# Patient Record
Sex: MALE | Race: WHITE | NOT HISPANIC OR LATINO | Employment: STUDENT | URBAN - METROPOLITAN AREA
[De-identification: names, ages, dates, MRNs, and addresses within clinical notes are randomized per-mention and may not be internally consistent; named-entity substitution may affect disease eponyms.]

---

## 2017-02-01 DIAGNOSIS — F84.5 ASPERGER'S SYNDROME: ICD-10-CM

## 2017-02-01 DIAGNOSIS — F42.4 SKIN PICKING HABIT: ICD-10-CM

## 2017-02-01 DIAGNOSIS — F98.8 ADD (ATTENTION DEFICIT DISORDER): ICD-10-CM

## 2017-02-01 DIAGNOSIS — F42.9 OBSESSIVE-COMPULSIVE DISORDER, UNSPECIFIED TYPE: ICD-10-CM

## 2017-02-01 RX ORDER — CITALOPRAM 40 MG/1
60 TABLET, FILM COATED ORAL DAILY
Qty: 45 TABLET | Refills: 5 | Status: SHIPPED | OUTPATIENT
Start: 2017-02-01 | End: 2017-04-26 | Stop reason: SDUPTHER

## 2017-02-01 RX ORDER — NALTREXONE HYDROCHLORIDE 50 MG/1
50 TABLET, FILM COATED ORAL DAILY
Qty: 30 TABLET | Refills: 5 | Status: SHIPPED | OUTPATIENT
Start: 2017-02-01 | End: 2017-03-15 | Stop reason: SDUPTHER

## 2017-02-01 RX ORDER — ATOMOXETINE 80 MG/1
160 CAPSULE ORAL DAILY
Qty: 60 CAPSULE | Refills: 5 | Status: SHIPPED | OUTPATIENT
Start: 2017-02-01 | End: 2017-03-15 | Stop reason: SDUPTHER

## 2017-02-01 RX ORDER — FLUPHENAZINE HYDROCHLORIDE 1 MG/1
2 TABLET ORAL DAILY
Qty: 60 TABLET | Refills: 5 | Status: SHIPPED | OUTPATIENT
Start: 2017-02-01 | End: 2017-03-15 | Stop reason: SDUPTHER

## 2017-03-15 ENCOUNTER — OFFICE VISIT (OUTPATIENT)
Dept: PSYCHIATRY | Facility: CLINIC | Age: 27
End: 2017-03-15
Payer: MEDICAID

## 2017-03-15 VITALS
WEIGHT: 139.81 LBS | DIASTOLIC BLOOD PRESSURE: 69 MMHG | HEIGHT: 69 IN | BODY MASS INDEX: 20.71 KG/M2 | SYSTOLIC BLOOD PRESSURE: 119 MMHG | HEART RATE: 75 BPM

## 2017-03-15 DIAGNOSIS — F84.0 AUTISM SPECTRUM DISORDER: ICD-10-CM

## 2017-03-15 DIAGNOSIS — F42.9 OBSESSIVE-COMPULSIVE DISORDER, UNSPECIFIED TYPE: ICD-10-CM

## 2017-03-15 DIAGNOSIS — F98.8 ADD (ATTENTION DEFICIT DISORDER): ICD-10-CM

## 2017-03-15 DIAGNOSIS — F40.10 SOCIAL ANXIETY DISORDER: Primary | ICD-10-CM

## 2017-03-15 DIAGNOSIS — F42.4 EXCORIATION (SKIN-PICKING) DISORDER: ICD-10-CM

## 2017-03-15 PROCEDURE — 99999 PR PBB SHADOW E&M-EST. PATIENT-LVL III: CPT | Mod: PBBFAC,,, | Performed by: PSYCHIATRY & NEUROLOGY

## 2017-03-15 PROCEDURE — 99213 OFFICE O/P EST LOW 20 MIN: CPT | Mod: PBBFAC | Performed by: PSYCHIATRY & NEUROLOGY

## 2017-03-15 PROCEDURE — 99213 OFFICE O/P EST LOW 20 MIN: CPT | Mod: AF,S$PBB,, | Performed by: PSYCHIATRY & NEUROLOGY

## 2017-03-15 PROCEDURE — 90833 PSYTX W PT W E/M 30 MIN: CPT | Mod: PBBFAC | Performed by: PSYCHIATRY & NEUROLOGY

## 2017-03-15 PROCEDURE — 90833 PSYTX W PT W E/M 30 MIN: CPT | Mod: AF,S$PBB,, | Performed by: PSYCHIATRY & NEUROLOGY

## 2017-03-15 RX ORDER — NALTREXONE HYDROCHLORIDE 50 MG/1
25 TABLET, FILM COATED ORAL DAILY
Qty: 7 TABLET | Refills: 0
Start: 2017-03-25 | End: 2017-04-01

## 2017-03-15 RX ORDER — ATOMOXETINE 80 MG/1
80 CAPSULE ORAL DAILY
Qty: 14 CAPSULE | Refills: 0
Start: 2017-04-03 | End: 2017-04-26 | Stop reason: ALTCHOICE

## 2017-03-15 RX ORDER — FLUPHENAZINE HYDROCHLORIDE 1 MG/1
1 TABLET ORAL DAILY
Qty: 7 TABLET | Refills: 0
Start: 2017-03-15 | End: 2017-04-26 | Stop reason: ALTCHOICE

## 2017-03-15 NOTE — PATIENT INSTRUCTIONS
1. Lets taper off fluphenazine first. Decrease it to 1 tablet daily for 7 days and then stop it.    2. Then in 10 days, decrease naltrexone to one half tablet daily for 7 days and then discontinue it completely.    3. On 4/3/2017, decrease Strattera down to one capsule daily for 14 days and then stop it completely.    4. See me back in the office at the end of April to check in and see if it is okay to take on a taper of citalopram.

## 2017-03-15 NOTE — MR AVS SNAPSHOT
[facility]  [address]  [facility] [address]  Phone: [phone]                  Jeffery Iniguez   3/15/2017 1:00 PM   Office Visit    Description:  Male : 1990   Provider:  Tyson Peacock MD   Department:  [facility]           Reason for Visit     Anxiety     Compulsions     attention dysregulation     Autism spectrum disorder           Diagnoses this Visit        Comments    Social anxiety disorder    -  Primary     Obsessive-compulsive disorder, unspecified type         Autism spectrum disorder         ADD (attention deficit disorder)         Excoriation (skin-picking) disorder                To Do List           Goals (5 Years of Data)     None      Follow-Up and Disposition     Return in about 6 weeks (around 2017) for f/u of medication and developmental progress.       These Medications        Disp Refills Start End    fluphenazine (PROLIXIN) 1 MG tablet 7 tablet 0 3/15/2017 3/22/2017    Take 1 tablet (1 mg total) by mouth once daily. Stop completely on or about 3/22/2017 - Oral    Pharmacy: [facility]/pharmacy #[id number] - [place], [place] - [address] Ph #: [phone]       naltrexone (DEPADE) 50 mg tablet 7 tablet 0 3/25/2017 2017    Take 0.5 tablets (25 mg total) by mouth once daily. For 7 days then discontinue - Oral    Pharmacy: [facility]/pharmacy #[id number] - [place], [place] - [address] Ph #: [phone]       atomoxetine (STRATTERA) 80 MG capsule 14 capsule 0 4/3/2017 2017    Take 1 capsule (80 mg total) by mouth once daily. (decrease to this dose on 4/3/2017, and stop it completely on or about 2017) - Oral    Pharmacy: [facility]/pharmacy #[id number]  [place], [place] - [address] Ph #: [phone]         [facility] On Call     [facility] On Call Nurse Care Line -  Assistance  Registered nurses in the [facility] provide clinical advisement, health education, appointment booking, and other advisory  services.  Call for this free service at [phone].             Medications           Message regarding Medications     Verify the changes and/or additions to your medication regime listed below are the same as discussed with your clinician today.  If any of these changes or additions are incorrect, please notify your healthcare provider.        CHANGE how you are taking these medications     Start Taking Instead of    fluphenazine (PROLIXIN) 1 MG tablet fluphenazine (PROLIXIN) 1 MG tablet    Dosage:  Take 1 tablet (1 mg total) by mouth once daily. Stop completely on or about 3/22/2017 Dosage:  Take 2 tablets (2 mg total) by mouth once daily.    Reason for Change:  Reorder     naltrexone (DEPADE) 50 mg tablet naltrexone (DEPADE) 50 mg tablet    Dosage:  Take 0.5 tablets (25 mg total) by mouth once daily. For 7 days then discontinue Dosage:  Take 1 tablet (50 mg total) by mouth once daily.    Reason for Change:  Reorder     Starting on: 3/25/2017     atomoxetine (STRATTERA) 80 MG capsule atomoxetine (STRATTERA) 80 MG capsule    Dosage:  Take 1 capsule (80 mg total) by mouth once daily. (decrease to this dose on 4/3/2017, and stop it completely on or about 4/17/2017) Dosage:  Take 2 capsules (160 mg total) by mouth once daily.    Reason for Change:  Reorder     Starting on: 4/3/2017            Verify that the below list of medications is an accurate representation of the medications you are currently taking.  If none reported, the list may be blank. If incorrect, please contact your healthcare provider. Carry this list with you in case of emergency.           Current Medications     AFLURIA [id number], PF, 45 mcg (15 mcg x 3)/0.5 mL Syrg     atomoxetine (STRATTERA) 80 MG capsule Starting on Apr 03, 2017. Take 1 capsule (80 mg total) by mouth once daily. (decrease to this dose on 4/3/2017, and stop it completely on or about 4/17/2017)    citalopram (CELEXA) 40 MG tablet Take 1.5 tablets (60 mg total) by mouth once  "daily.    clindamycin phosphate 1% (CLINDAGEL) 1 % gel     doxycycline (VIBRAMYCIN) 100 MG Cap     fluphenazine (PROLIXIN) 1 MG tablet Take 1 tablet (1 mg total) by mouth once daily. Stop completely on or about 3/22/2017    naltrexone (DEPADE) 50 mg tablet Starting on Mar 25, 2017. Take 0.5 tablets (25 mg total) by mouth once daily. For 7 days then discontinue    tretinoin (RETIN-A) 0.05 % cream            Clinical Reference Information           Your Vitals Were     BP Pulse Height Weight BMI    119/69 75 5' 9" (1.753 m) 63.4 kg (139 lb 12.8 oz) 20.64 kg/m2      Blood Pressure          Most Recent Value    BP  119/69      Allergies as of 3/15/2017     No Known Allergies      Immunizations Administered on Date of Encounter - 3/15/2017     None      [facility] Sign-Up     Activating your [facility] account is as easy as 1-2-3!     1) Visit [email or web address], select Sign Up Now, enter this activation code and your date of birth, then select Next.  [email or web address]  Expires: 4/29/2017  1:23 PM      2) Create a username and password to use when you visit [facility] in the future and select a security question in case you lose your password and select Next.    3) Enter your e-mail address and click Sign Up!    Additional Information  If you have questions, please e-mail [email or web address] or call [phone] to talk to our [facility] staff. Remember, [facility] is NOT to be used for urgent needs. For medical emergencies, dial 911.         Instructions    1. Lets taper off fluphenazine first. Decrease it to 1 tablet daily for 7 days and then stop it.    2. Then in 10 days, decrease naltrexone to one half tablet daily for 7 days and then discontinue it completely.    3. On 4/3/2017, decrease Strattera down to one capsule daily for 14 days and then stop it completely.    4. See me back in the office at the end of April to check in and see if it is okay to take on a taper of citalopram.       Language Assistance Services     " ATTENTION: Language assistance services are available, free of charge. Please call [phone].      ATENCIÓN: Si habla español, tiene a erazo disposición servicios gratuitos de asistencia lingüística. Llame al [phone].     CHÚ Ý: N?u b?n nói Ti?ng Vi?t, có các d?ch v? h? tr? ngôn ng? mi?n phí dành cho b?n. G?i s? [phone].         [facility] complies with applicable Federal civil rights laws and does not discriminate on the basis of race, color, national origin, age, disability, or sex.

## 2017-03-15 NOTE — PROGRESS NOTES
Outpatient Psychiatry Follow-Up Visit (MD/NP)  3/15/2017    Clinical Status of Patient:  Outpatient (Ambulatory)    Chief Complaint:  Jeffery Iniguez is a 26 y.o. 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 The patient's progress toward treatment goals is good.   · Duration of intervention: 21 minutes    Review of Systems   History obtained from the patient.  General ROS: negative for - fatigue, weight gain and weight loss  Ophthalmic ROS: negative for - decreased vision or dry eyes  ENT ROS: negative for - epistaxis, nasal congestion or oral lesions  Hematological and Lymphatic ROS: negative for - bruising, jaundice or pallor  Endocrine ROS: negative for - change in hair pattern, galactorrhea, skin changes or temperature intolerance  Respiratory ROS: no cough, shortness of breath, or wheezing  Cardiovascular ROS: no chest pain or dyspnea on exertion  Gastrointestinal ROS: no abdominal pain, change in bowel habits  Urinary ROS: no dysuria, trouble voiding or hematuria  Musculoskeletal ROS: negative for - joint pain, muscle pain or excess muscle tone  Neurological ROS: negative for - headaches, seizures, tremors, tics, or other AIMs  Dermatological  ROS: negative for pruritus and rash. Acne is gradually improving    Past Medical, Family and Social History: The patient's past medical, family and social history have been reviewed and updated as appropriate within the electronic medical record - see encounter notes.  Past Medical History:   Diagnosis Date    Autism spectrum disorder, level 1 7/9/2012    Social anxiety disorder 7/9/2012    Girls and dating fears      Current Outpatient Prescriptions on File Prior to Visit   Medication Sig Dispense Refill    AFLURIA [id number], PF, 45 mcg (15 mcg x 3)/0.5 mL Syrg   0    citalopram (CELEXA) 40 MG tablet Take 1.5 tablets (60 mg total) by mouth once daily. 45 tablet 5    clindamycin phosphate 1% (CLINDAGEL) 1 % gel       doxycycline (VIBRAMYCIN) 100 MG Cap       tretinoin (RETIN-A) 0.05 % cream       [DISCONTINUED] atomoxetine (STRATTERA) 80 MG capsule Take 2 capsules (160 mg total) by mouth once daily. 60 capsule 5    [DISCONTINUED] fluphenazine (PROLIXIN) 1 MG tablet Take 2 tablets (2 mg total) by mouth once daily. 60 tablet 5    [DISCONTINUED] naltrexone (DEPADE) 50 mg tablet Take 1 tablet (50 mg total) by mouth once daily. 30 tablet 5     No current facility-administered medications on file prior to visit.    Compliance: yes  Side effects: None    Risk Parameters:  Patient reports no suicidal ideation  Patient reports no homicidal ideation  Patient reports no self-injurious behavior  Patient reports no violent behavior    Exam (detailed: at least 9 elements; comprehensive: all 15 elements)   Constitutional  Vitals:    Vitals:    03/15/17 1307   BP: 119/69   Pulse: 75      General:  younger than stated age, casually dressed, neatly groomed, lean     Musculoskeletal  Muscle Strength/Tone:  no tremor, no tic   Gait & Station:  non-ataxic   Psychiatric  Speech:  spontaneous, rapid   Mood & Affect:  euthymic  mood-congruent   Thought Process:  normal and logical, goal-directed   Associations:  intact   Thought  Content:  normal, no suicidality, no homicidality, delusions, or paranoia   Insight:  has awareness of illness   Judgement: age appropriate   Orientation:  person, place, time/date, situation, day of week   Memory: intact for content of interview   Language: grossly intact   Attention Span & Concentration:  able to focus, completed tasks   Fund of Knowledge:  intact and appropriate to age and level of education     Assessment and Diagnosis   Status/Progress: Based on the examination today, the patient's problem(s) is/are well controlled.  New problems have not been presented today.   Comorbidities complicate management of the primary condition.  There are no active rule-out diagnoses for this patient at this time.    General Impression: doing well, I agree that it is time to attempt to taper off at least part of his complex medication regimen.    Axis I:   1. Social anxiety disorder     2. Obsessive-compulsive disorder, unspecified type  fluphenazine (PROLIXIN) 1 MG tablet   3. Autism spectrum disorder, level 1     4. ADD (attention deficit disorder)  atomoxetine (STRATTERA) 80 MG capsule   5. Excoriation (skin-picking) disorder  naltrexone (DEPADE) 50 mg tablet   6. Skin picking habit       Axis II: mathematics disorder  Axis III: healthy  Axis IV: educational problems  Axis V: 59    Intervention/Counseling/Treatment Plan   · Medication Management: The risks and benefits of medication were discussed with the patient. 1. Lets taper off fluphenazine first. Decrease it to 1 tablet daily for 7 days and then stop it.  · Outside records/collateral information from additional sources: reviewed collateral from parents with whom he lives  · Counseling provided with patient as follows: risks and benefits of treatment options, including medications, were discussed with the patient, risk factor reduction  · Care Coordination: During the visit, care coordination was conducted with  family.    Return to Clinic: 6 weeks

## 2017-04-26 ENCOUNTER — OFFICE VISIT (OUTPATIENT)
Dept: PSYCHIATRY | Facility: CLINIC | Age: 27
End: 2017-04-26
Payer: MEDICAID

## 2017-04-26 VITALS
WEIGHT: 141.81 LBS | BODY MASS INDEX: 21 KG/M2 | HEIGHT: 69 IN | DIASTOLIC BLOOD PRESSURE: 57 MMHG | HEART RATE: 94 BPM | SYSTOLIC BLOOD PRESSURE: 103 MMHG

## 2017-04-26 DIAGNOSIS — F42.9 OBSESSIVE-COMPULSIVE DISORDER, UNSPECIFIED TYPE: Primary | ICD-10-CM

## 2017-04-26 DIAGNOSIS — F98.8 ADD (ATTENTION DEFICIT DISORDER): ICD-10-CM

## 2017-04-26 DIAGNOSIS — F84.5 ASPERGER'S SYNDROME: ICD-10-CM

## 2017-04-26 DIAGNOSIS — F40.10 SOCIAL ANXIETY DISORDER: ICD-10-CM

## 2017-04-26 DIAGNOSIS — F84.0 AUTISM SPECTRUM DISORDER: ICD-10-CM

## 2017-04-26 DIAGNOSIS — F42.4 EXCORIATION (SKIN-PICKING) DISORDER: ICD-10-CM

## 2017-04-26 PROCEDURE — 99213 OFFICE O/P EST LOW 20 MIN: CPT | Mod: PBBFAC | Performed by: PSYCHIATRY & NEUROLOGY

## 2017-04-26 PROCEDURE — 99213 OFFICE O/P EST LOW 20 MIN: CPT | Mod: HB,AF,S$PBB, | Performed by: PSYCHIATRY & NEUROLOGY

## 2017-04-26 PROCEDURE — 90833 PSYTX W PT W E/M 30 MIN: CPT | Mod: PBBFAC | Performed by: PSYCHIATRY & NEUROLOGY

## 2017-04-26 PROCEDURE — 90833 PSYTX W PT W E/M 30 MIN: CPT | Mod: HB,AF,S$PBB, | Performed by: PSYCHIATRY & NEUROLOGY

## 2017-04-26 PROCEDURE — 99999 PR PBB SHADOW E&M-EST. PATIENT-LVL III: CPT | Mod: PBBFAC,,, | Performed by: PSYCHIATRY & NEUROLOGY

## 2017-04-26 RX ORDER — CITALOPRAM 40 MG/1
40 TABLET, FILM COATED ORAL DAILY
Qty: 30 TABLET | Refills: 1 | Status: SHIPPED | OUTPATIENT
Start: 2017-04-26 | End: 2017-08-09 | Stop reason: SDUPTHER

## 2017-04-26 NOTE — MR AVS SNAPSHOT
[facility]  [address]  [place] [address]  Phone: [phone]                  Jeffery Iniguez   2017 1:00 PM   Office Visit    Description:  Male : 1990   Provider:  Tyson Peacock MD   Department:  [facility]           Reason for Visit     Anxiety     attention dysregulation           Diagnoses this Visit        Comments    Obsessive-compulsive disorder, unspecified type    -  Primary     Social anxiety disorder         Autism spectrum disorder         ADD (attention deficit disorder)         Excoriation (skin-picking) disorder         Asperger's syndrome                To Do List           Goals (5 Years of Data)     None      Follow-Up and Disposition     Return in about 2 months (around 2017) for f/u of medication and developmental progress.       These Medications        Disp Refills Start End    citalopram (CELEXA) 40 MG tablet 30 tablet 1 2017    Take 1 tablet (40 mg total) by mouth once daily. - Oral    Pharmacy: [facility]/pharmacy #[id number] - [place], [place] - [address] Ph #: [phone]       Notes to Pharmacy: Please discontinue any remaining refills at the 60 mg daily dose      [facility]     [facility] On Call Nurse Care Line -  Assistance  Unless otherwise directed by your provider, please contact [facility] On-Call, our nurse care line that is available for  assistance.     Registered nurses in the [facility] On Call Center provide: appointment scheduling, clinical advisement, health education, and other advisory services.  Call: [phone] (toll free)               Medications           Message regarding Medications     Verify the changes and/or additions to your medication regime listed below are the same as discussed with your clinician today.  If any of these changes or additions are incorrect, please notify your healthcare provider.        CHANGE how you are taking these medications      "Start Taking Instead of    citalopram (CELEXA) 40 MG tablet citalopram (CELEXA) 40 MG tablet    Dosage:  Take 1 tablet (40 mg total) by mouth once daily. Dosage:  Take 1.5 tablets (60 mg total) by mouth once daily.    Reason for Change:  Reorder       STOP taking these medications     atomoxetine (STRATTERA) 80 MG capsule Take 1 capsule (80 mg total) by mouth once daily. (decrease to this dose on 4/3/2017, and stop it completely on or about 4/17/2017)    fluphenazine (PROLIXIN) 1 MG tablet Take 1 tablet (1 mg total) by mouth once daily. Stop completely on or about 3/22/2017           Verify that the below list of medications is an accurate representation of the medications you are currently taking.  If none reported, the list may be blank. If incorrect, please contact your healthcare provider. Carry this list with you in case of emergency.           Current Medications     AFLURIA [id number], PF, 45 mcg (15 mcg x 3)/0.5 mL Syrg     citalopram (CELEXA) 40 MG tablet Take 1 tablet (40 mg total) by mouth once daily.    clindamycin phosphate 1% (CLINDAGEL) 1 % gel     doxycycline (VIBRAMYCIN) 100 MG Cap     tretinoin (RETIN-A) 0.05 % cream            Clinical Reference Information           Your Vitals Were     BP Pulse Height Weight BMI    103/57 94 5' 9" (1.753 m) 64.3 kg (141 lb 12.8 oz) 20.94 kg/m2      Blood Pressure          Most Recent Value    BP  (!)  103/57      Allergies as of 4/26/2017     No Known Allergies      Immunizations Administered on Date of Encounter - 4/26/2017     None      [facility] Sign-Up     Activating your [facility] account is as easy as 1-2-3!     1) Visit [email or web address], select Sign Up Now, enter this activation code and your date of birth, then select Next.  [email or web address]  Expires: 4/29/2017  1:23 PM      2) Create a username and password to use when you visit [facility] in the future and select a security question in case you lose your password and select Next.    3) Enter your e-mail " address and click Sign Up!    Additional Information  If you have questions, please e-mail [email or web address] or call [phone] to talk to our [facility] staff. Remember, [facility] is NOT to be used for urgent needs. For medical emergencies, dial 911.         Language Assistance Services     ATTENTION: Language assistance services are available, free of charge. Please call [phone].      ATENCIÓN: Si habla justinañol, tiene a erazo disposición servicios gratuitos de asistencia lingüística. Llame al [phone].     CHÚ Ý: N?u b?n nói Ti?ng Vi?t, có các d?ch v? h? tr? ngôn ng? mi?n phí dành cho b?n. G?i s? [phone].         [facility] complies with applicable Federal civil rights laws and does not discriminate on the basis of race, color, national origin, age, disability, or sex.

## 2017-04-26 NOTE — PROGRESS NOTES
Outpatient Psychiatry Follow-Up Visit (MD/NP)  4/26/2017    Clinical Status of Patient:  Outpatient (Ambulatory)    Chief Complaint:  Jeffery Iinguez is a 27 y.o. 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 The patient's progress toward treatment goals is good.   · Duration of intervention: 21 minutes    Review of Systems   History obtained from the patient.  General ROS: negative for - fatigue, weight gain and weight loss  Ophthalmic ROS: negative for - decreased vision or dry eyes  ENT ROS: negative for - epistaxis, nasal congestion or oral lesions  Hematological and Lymphatic ROS: negative for - bruising, jaundice or pallor  Endocrine ROS: negative for - change in hair pattern, galactorrhea, skin changes or temperature intolerance  Respiratory ROS: no cough, shortness of breath, or wheezing  Cardiovascular ROS: no chest pain or dyspnea on exertion  Gastrointestinal ROS: no abdominal pain, change in bowel habits  Urinary ROS: no dysuria, trouble voiding or hematuria  Musculoskeletal ROS: negative for - joint pain, muscle pain or excess muscle tone  Neurological ROS: negative for - headaches, seizures, tremors, tics, or other AIMs  Dermatological ROS: negative for pruritus and rash.  Acne is gradually improving    Past Medical, Family and Social History: The patient's past medical, family and social history have been reviewed and updated as appropriate within the electronic medical record - see encounter notes.  Past Medical History:   Diagnosis Date    Autism spectrum disorder, level 1 7/9/2012    Social anxiety disorder 7/9/2012    Girls and dating fears      Current Outpatient Prescriptions on File Prior to Visit   Medication Sig Dispense Refill    AFLURIA [id number], PF, 45 mcg (15 mcg x 3)/0.5 mL Syrg   0    clindamycin phosphate 1% (CLINDAGEL) 1 % gel       doxycycline (VIBRAMYCIN) 100 MG Cap       tretinoin (RETIN-A) 0.05 % cream       [DISCONTINUED] atomoxetine (STRATTERA) 80 MG capsule Take 1 capsule (80 mg total) by mouth once daily. (decrease to this dose on 4/3/2017, and stop it completely on or about 4/17/2017) 14 capsule 0    [DISCONTINUED] citalopram (CELEXA) 40 MG tablet Take 1.5 tablets (60 mg total) by mouth once daily. 45 tablet 5    [DISCONTINUED] fluphenazine (PROLIXIN) 1 MG tablet Take 1 tablet (1 mg total) by mouth once daily. Stop completely on or about 3/22/2017 7 tablet 0     No current facility-administered medications on file prior to visit.    Compliance: yes  Side effects: None    Risk Parameters:  Patient reports no suicidal ideation  Patient reports no homicidal ideation  Patient reports no self-injurious behavior  Patient reports no violent behavior    Exam (detailed: at least 9 elements; comprehensive: all 15 elements)   Constitutional  Vitals:    Vitals:    04/26/17 1302   BP: (!) 103/57   Pulse: 94      General:  younger than stated age, casually dressed, neatly groomed, lean     Musculoskeletal  Muscle Strength/Tone:  no tremor, no tic   Gait & Station:  non-ataxic   Psychiatric  Speech:  spontaneous, rapid   Mood & Affect:  euthymic  mood-congruent   Thought Process:  normal and logical, goal-directed   Associations:  intact   Thought Content:   normal, no suicidality, no homicidality, delusions, or paranoia   Insight:  has awareness of illness   Judgement: age appropriate   Orientation:  person, place, time/date, situation, day of week   Memory: intact for content of interview   Language: grossly intact   Attention Span & Concentration:  able to focus, completed tasks   Fund of Knowledge:  intact and appropriate to age and level of education     Assessment and Diagnosis   Status/Progress: Based on the examination today, the patient's problem(s) is/are well controlled.  New problems have not been presented today.   Comorbidities complicate management of the primary condition.  There are no active rule-out diagnoses for this patient at this time.    General Impression:   doing well, I agree that it is time to attempt to taper off at least part of his complex medication regimen.    Axis I:   1. Obsessive-compulsive disorder, unspecified type     2. Social anxiety disorder     3. Autism spectrum disorder, level 1     4. ADD (attention deficit disorder)     5. Excoriation (skin-picking) disorder     6. Asperger's syndrome  citalopram (CELEXA) 40 MG tablet     Axis II: mathematics disorder  Axis III: healthy  Axis IV: educational problems  Axis V: 59    Intervention/Counseling/Treatment Plan   · Medication Management: The risks and benefits of medication were discussed with the patient. 1. Lets taper off fluphenazine first. Decrease it to 1 tablet daily for 7 days and then stop it.  · Outside records/collateral information from additional sources: reviewed collateral from parents with whom he lives  · Counseling provided with patient as follows: risks and benefits of treatment options, including medications, were discussed with the patient, risk factor reduction  · Care Coordination: During the visit, care coordination was conducted with  family.    Return to Clinic: 6 weeks

## 2017-08-09 ENCOUNTER — OFFICE VISIT (OUTPATIENT)
Dept: PSYCHIATRY | Facility: CLINIC | Age: 27
End: 2017-08-09
Payer: MEDICAID

## 2017-08-09 VITALS
SYSTOLIC BLOOD PRESSURE: 104 MMHG | HEART RATE: 81 BPM | BODY MASS INDEX: 21.03 KG/M2 | HEIGHT: 69 IN | DIASTOLIC BLOOD PRESSURE: 57 MMHG | WEIGHT: 142 LBS

## 2017-08-09 DIAGNOSIS — F42.4 EXCORIATION (SKIN-PICKING) DISORDER: Primary | ICD-10-CM

## 2017-08-09 DIAGNOSIS — F84.5 ASPERGER'S SYNDROME: ICD-10-CM

## 2017-08-09 DIAGNOSIS — F84.0 AUTISM SPECTRUM DISORDER: ICD-10-CM

## 2017-08-09 PROCEDURE — 90833 PSYTX W PT W E/M 30 MIN: CPT | Mod: PBBFAC | Performed by: PSYCHIATRY & NEUROLOGY

## 2017-08-09 PROCEDURE — 99999 PR PBB SHADOW E&M-EST. PATIENT-LVL III: CPT | Mod: PBBFAC,,, | Performed by: PSYCHIATRY & NEUROLOGY

## 2017-08-09 PROCEDURE — 99213 OFFICE O/P EST LOW 20 MIN: CPT | Mod: PBBFAC | Performed by: PSYCHIATRY & NEUROLOGY

## 2017-08-09 PROCEDURE — 99213 OFFICE O/P EST LOW 20 MIN: CPT | Mod: AF,HB,S$PBB, | Performed by: PSYCHIATRY & NEUROLOGY

## 2017-08-09 PROCEDURE — 90833 PSYTX W PT W E/M 30 MIN: CPT | Mod: AF,HB,S$PBB, | Performed by: PSYCHIATRY & NEUROLOGY

## 2017-08-09 PROCEDURE — 3008F BODY MASS INDEX DOCD: CPT | Mod: ,,, | Performed by: PSYCHIATRY & NEUROLOGY

## 2017-08-09 RX ORDER — CITALOPRAM 20 MG/1
20 TABLET, FILM COATED ORAL DAILY
Qty: 30 TABLET | Refills: 1 | Status: SHIPPED | OUTPATIENT
Start: 2017-08-09 | End: 2017-11-03 | Stop reason: ALTCHOICE

## 2017-08-09 NOTE — PROGRESS NOTES
Outpatient Psychiatry Follow-Up Visit (MD/NP)  8/9/2017    Clinical Status of Patient:  Outpatient (Ambulatory)    Chief Complaint:  Jeffery Iniguez is a 27 y.o. male who presents today for follow-up of No chief complaint on file.    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 The patient's progress toward treatment goals is good.   · Duration of intervention: 21 minutes    Review of Systems   History obtained from the patient.  General ROS: negative for - fatigue, weight gain and weight loss  Ophthalmic ROS: negative for - decreased vision or dry eyes  ENT ROS: negative for - epistaxis, nasal congestion or oral lesions  Hematological and Lymphatic ROS: negative for - bruising, jaundice or pallor  Endocrine ROS: negative for - change in hair pattern, galactorrhea, skin changes or temperature intolerance  Respiratory ROS: no cough, shortness of breath, or wheezing  Cardiovascular ROS: no chest pain or dyspnea on exertion  Gastrointestinal ROS: no abdominal pain, change in bowel habits  Urinary ROS: no dysuria, trouble voiding or hematuria  Musculoskeletal ROS: negative for - joint pain, muscle pain or excess muscle tone  Neurological ROS: negative for - headaches, seizures, tremors, tics, or other AIMs  Dermatological ROS: negative for pruritus and rash. Acne is  gradually improving    Past Medical, Family and Social History: The patient's past medical, family and social history have been reviewed and updated as appropriate within the electronic medical record - see encounter notes.  Past Medical History:   Diagnosis Date    Autism spectrum disorder, level 1 7/9/2012    Social anxiety disorder 7/9/2012    Girls and dating fears      Current Outpatient Prescriptions on File Prior to Visit   Medication Sig Dispense Refill    AFLURIA [id number], PF, 45 mcg (15 mcg x 3)/0.5 mL Syrg   0    clindamycin phosphate 1% (CLINDAGEL) 1 % gel       doxycycline (VIBRAMYCIN) 100 MG Cap       tretinoin (RETIN-A) 0.05 % cream       [DISCONTINUED] citalopram (CELEXA) 40 MG tablet Take 1 tablet (40 mg total) by mouth once daily. 30 tablet 1     No current facility-administered medications on file prior to visit.    Compliance: yes  Side effects: None    Risk Parameters:  Patient reports no suicidal ideation  Patient reports no homicidal ideation  Patient reports no self-injurious behavior  Patient reports no violent behavior    Exam (detailed: at least 9 elements; comprehensive: all 15 elements)   Constitutional  Vitals:    Vitals:    08/09/17 1023   BP: (!) 104/57   Pulse: 81      General:  younger than stated age, casually dressed, neatly groomed, lean     Musculoskeletal  Muscle Strength/Tone:  no tremor, no tic   Gait & Station:  non-ataxic   Psychiatric  Speech:  spontaneous, rapid   Mood & Affect:  euthymic  mood-congruent   Thought Process:  normal and logical, goal-directed   Associations:  intact   Thought Content:  normal, no suicidality, no homicidality, delusions, or paranoia   Insight:  has awareness of illness   Judgement: age appropriate   Orientation:  person, place, time/date, situation, day of week   Memory: intact for content of interview   Language: grossly intact   Attention Span & Concentration:  able to focus, completed tasks   Fund of Knowledge:  intact and  appropriate to age and level of education     Assessment and Diagnosis   Status/Progress: Based on the examination today, the patient's problem(s) is/are well controlled.  New problems have not been presented today.   Comorbidities complicate management of the primary condition.  There are no active rule-out diagnoses for this patient at this time.    General Impression:   doing well, I agree that it is time to attempt to taper off at least part of his complex medication regimen.    Axis I:   1. Excoriation (skin-picking) disorder     2. Autism spectrum disorder, level 1     3. Asperger's syndrome  citalopram (CELEXA) 20 MG tablet     Axis II: mathematics disorder  Axis III: healthy  Axis IV: educational problems  Axis V: 59    Intervention/Counseling/Treatment Plan   · Medication Management: The risks and benefits of medication were discussed with the patient. 1. Lets taper off fluphenazine first. Decrease it to 1 tablet daily for 7 days and then stop it.  · Outside records/collateral information from additional sources: reviewed collateral from parents with whom he lives  · Counseling provided with patient as follows: risks and benefits of treatment options, including medications, were discussed with the patient, risk factor reduction  · Care Coordination: During the visit, care coordination was conducted with  family.    Return to Clinic: 6 weeks

## 2017-08-25 ENCOUNTER — OFFICE VISIT (OUTPATIENT)
Dept: PSYCHIATRY | Facility: CLINIC | Age: 27
End: 2017-08-25
Payer: MEDICAID

## 2017-08-25 VITALS
BODY MASS INDEX: 21.18 KG/M2 | HEART RATE: 71 BPM | SYSTOLIC BLOOD PRESSURE: 110 MMHG | WEIGHT: 143 LBS | HEIGHT: 69 IN | DIASTOLIC BLOOD PRESSURE: 55 MMHG

## 2017-08-25 DIAGNOSIS — F42.4 EXCORIATION (SKIN-PICKING) DISORDER: ICD-10-CM

## 2017-08-25 DIAGNOSIS — F84.0 AUTISM SPECTRUM DISORDER: Primary | ICD-10-CM

## 2017-08-25 PROCEDURE — 99213 OFFICE O/P EST LOW 20 MIN: CPT | Mod: PBBFAC | Performed by: PSYCHIATRY & NEUROLOGY

## 2017-08-25 PROCEDURE — 99999 PR PBB SHADOW E&M-EST. PATIENT-LVL III: CPT | Mod: PBBFAC,,, | Performed by: PSYCHIATRY & NEUROLOGY

## 2017-08-25 PROCEDURE — 90833 PSYTX W PT W E/M 30 MIN: CPT | Mod: AF,HB,S$PBB, | Performed by: PSYCHIATRY & NEUROLOGY

## 2017-08-25 PROCEDURE — 99213 OFFICE O/P EST LOW 20 MIN: CPT | Mod: AF,HB,S$PBB, | Performed by: PSYCHIATRY & NEUROLOGY

## 2017-08-25 PROCEDURE — 90833 PSYTX W PT W E/M 30 MIN: CPT | Mod: PBBFAC | Performed by: PSYCHIATRY & NEUROLOGY

## 2017-08-25 NOTE — PROGRESS NOTES
"Outpatient Psychiatry Follow-Up Visit (MD/NP)  8/25/2017    Clinical Status of Patient:  Outpatient (Ambulatory)    Chief Complaint:  Jeffery Iniguez is a 27 y.o. 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   · Biggest concern right now, as a month ago, is bitter feelings toward his twin sister    Psychotherapy:  · Target symptoms: anxiety , loneliness, anger  · Why chosen therapy is appropriate versus another modality: relevant to diagnosis, patient responds to this modality  · Outcome monitoring methods: self-report, observation, feedback from family  · Therapeutic intervention type: insight oriented psychotherapy  · Topics discussed/themes: relationships difficulties, life stage transitional issues  · The patient's response to the intervention is accepting. The patient's progress toward treatment goals is good.   · Duration of intervention: 21 minutes    Review of Systems   History obtained from the patient.  General ROS: negative for - weight gain and weight loss  Ophthalmic ROS: negative for - decreased vision or dry eyes  ENT ROS: negative for - epistaxis, nasal congestion or oral lesions  Endocrine ROS: negative for - change in hair pattern, galactorrhea, skin changes or temperature intolerance  Respiratory ROS: no cough, shortness of breath  Cardiovascular ROS: no chest pain or dyspnea on exertion  Gastrointestinal ROS: no abdominal pain, change in bowel habits  Urinary ROS: no dysuria, trouble voiding or hematuria  Neurological ROS: negative for - headaches, seizures, tremors, tics, or other AIMs  Dermatological ROS: negative for pruritus and rash. Acne is " "gradually improving. No excoriations!    Past Medical, Family and Social History: The patient's past medical, family and social history have been reviewed and updated as appropriate within the electronic medical record - see encounter notes.  Past Medical History:   Diagnosis Date    Autism spectrum disorder, level 1 7/9/2012    Social anxiety disorder 7/9/2012    Girls and dating fears      Current Outpatient Prescriptions on File Prior to Visit   Medication Sig Dispense Refill    AFLURIA [id number], PF, 45 mcg (15 mcg x 3)/0.5 mL Syrg   0    citalopram (CELEXA) 20 MG tablet Take 1 tablet (20 mg total) by mouth once daily. 30 tablet 1    clindamycin phosphate 1% (CLINDAGEL) 1 % gel       doxycycline (VIBRAMYCIN) 100 MG Cap       tretinoin (RETIN-A) 0.05 % cream        No current facility-administered medications on file prior to visit.    Compliance: yes  Side effects: None    Risk Parameters:  Patient reports no suicidal ideation  Patient reports no homicidal ideation  Patient reports no self-injurious behavior  Patient reports no violent behavior  Denies any alcohol or drug use or sexual risk behaviors    Exam (detailed: at least 9 elements; comprehensive: all 15 elements)   Constitutional  Vitals:   height is 5' 9" (1.753 m) and weight is 64.9 kg (143 lb). His blood pressure is 110/55 (abnormal) and his pulse is 71.      General:  casually dressed, neatly groomed, lean   Musculoskeletal  Muscle Strength/Tone:  no tremor, no tic   Gait & Station:  non-ataxic   Psychiatric  Speech:  spontaneous, rapid   Mood & Affect:  euthymic  mood-congruent   Thought Process:  goal-directed, logical   Associations:  intact   Thought Content:  normal, no suicidality, no homicidality, delusions, or paranoia   Insight:  has awareness of illness   Judgement: age appropriate   Orientation:  person, place, time/date, situation, day of week   Memory: intact for content of interview   Language: grossly intact   Attention Span & " Concentration:  able to focus, completed tasks   Fund of Knowledge:  intact and appropriate to age and level of education     Assessment and Diagnosis   Status/Progress: Based on the examination today, the patient's problem(s) is/are well controlled, but with subtle signs of increased sensitivity/irritabilty as he tapers off citalopram.  New problems have not been presented today.   Comorbidities complicate management of the primary condition.  There are no active rule-out diagnoses for this patient at this time.    General Impression:   Struggling with separation-differentiation challenges with enmeshed co-twin sister    Axis I:   1. Autism spectrum disorder, level 1     2. Excoriation (skin-picking) disorder         Intervention/Counseling/Treatment Plan   · Medication Management: continue citalopram 20 mg daily for another 6 weeks and reassess whether further taper is possible.  · Outside records/collateral information from additional sources: reviewed collateral from mother  · Counseling provided with patient as follows: prognosis  · Care Coordination: During the visit, care coordination was conducted with  family.    Return to Clinic: 2 months

## 2017-11-03 ENCOUNTER — OFFICE VISIT (OUTPATIENT)
Dept: PSYCHIATRY | Facility: CLINIC | Age: 27
End: 2017-11-03
Payer: MEDICAID

## 2017-11-03 VITALS
WEIGHT: 145.81 LBS | SYSTOLIC BLOOD PRESSURE: 112 MMHG | DIASTOLIC BLOOD PRESSURE: 70 MMHG | HEIGHT: 69 IN | BODY MASS INDEX: 21.6 KG/M2 | HEART RATE: 68 BPM

## 2017-11-03 DIAGNOSIS — F42.4 EXCORIATION (SKIN-PICKING) DISORDER: ICD-10-CM

## 2017-11-03 DIAGNOSIS — F84.0 AUTISM SPECTRUM DISORDER: Primary | ICD-10-CM

## 2017-11-03 PROCEDURE — 90833 PSYTX W PT W E/M 30 MIN: CPT | Mod: AF,HB,S$PBB, | Performed by: PSYCHIATRY & NEUROLOGY

## 2017-11-03 PROCEDURE — 99213 OFFICE O/P EST LOW 20 MIN: CPT | Mod: AF,HB,S$PBB, | Performed by: PSYCHIATRY & NEUROLOGY

## 2017-11-03 PROCEDURE — 90833 PSYTX W PT W E/M 30 MIN: CPT | Mod: PBBFAC | Performed by: PSYCHIATRY & NEUROLOGY

## 2017-11-03 PROCEDURE — 99999 PR PBB SHADOW E&M-EST. PATIENT-LVL III: CPT | Mod: PBBFAC,,, | Performed by: PSYCHIATRY & NEUROLOGY

## 2017-11-03 PROCEDURE — 99213 OFFICE O/P EST LOW 20 MIN: CPT | Mod: PBBFAC | Performed by: PSYCHIATRY & NEUROLOGY

## 2017-11-03 NOTE — PROGRESS NOTES
"Outpatient Psychiatry Follow-Up Visit (MD/NP)  11/3/2017    Clinical Status of Patient:  Outpatient (Ambulatory)  Chief Complaint:  Jeffery Iniguez is a 27 y.o. 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   · Biggest concern right now, as a month ago, is bitter feelings toward his twin sister    Psychotherapy:  · Target symptoms: anxiety , loneliness, anger  · Why chosen therapy is appropriate versus another modality: relevant to diagnosis, patient responds to this modality  · Outcome monitoring methods: self-report, observation, feedback from family  · Therapeutic intervention type: insight oriented psychotherapy  · Topics discussed/themes: relationships difficulties, life stage transitional issues  · The patient's response to the intervention is accepting. The patient's progress toward treatment goals is good.   · Duration of intervention: 21 minutes    Review of Systems   History obtained from the patient.  General ROS: negative for - weight gain and weight loss  Ophthalmic ROS: negative for - decreased vision or dry eyes  ENT ROS: negative for - epistaxis, nasal congestion or oral lesions  Endocrine ROS: negative for - change in hair pattern, galactorrhea, skin changes or temperature intolerance  Respiratory ROS: no cough, shortness of breath  Cardiovascular ROS: no chest pain or dyspnea on exertion  Gastrointestinal ROS: no abdominal pain, change in bowel habits  Urinary ROS: no dysuria, trouble voiding or hematuria  Neurological ROS: negative for - headaches, seizures, tremors, tics, or other AIMs  Dermatological ROS: negative for pruritus and rash. Acne is gradually improving. No excoriations!    Past Medical, Family and " "Social History: The patient's past medical, family and social history have been reviewed and updated as appropriate within the electronic medical record - see encounter notes.  Past Medical History:   Diagnosis Date    Autism spectrum disorder, level 1 7/9/2012    Social anxiety disorder 7/9/2012    Girls and dating fears      Current Outpatient Prescriptions on File Prior to Visit   Medication Sig Dispense Refill    AFLURIA [id number], PF, 45 mcg (15 mcg x 3)/0.5 mL Syrg   0    clindamycin phosphate 1% (CLINDAGEL) 1 % gel       doxycycline (VIBRAMYCIN) 100 MG Cap       tretinoin (RETIN-A) 0.05 % cream       [DISCONTINUED] citalopram (CELEXA) 20 MG tablet Take 1 tablet (20 mg total) by mouth once daily. 30 tablet 1     No current facility-administered medications on file prior to visit.    Compliance: yes  Side effects: None    Risk Parameters:  Patient reports no suicidal ideation  Patient reports no homicidal ideation  Patient reports no self-injurious behavior  Patient reports no violent behavior  Denies any alcohol or drug use or sexual risk behaviors    Exam (detailed: at least 9 elements; comprehensive: all 15 elements)   Constitutional  Vitals:   height is 5' 9" (1.753 m) and weight is 66.1 kg (145 lb 12.8 oz). His blood pressure is 112/70 and his pulse is 68.      General:  casually dressed, neatly groomed, lean   Musculoskeletal  Muscle Strength/Tone:  no tremor, no tic   Gait & Station:  non-ataxic   Psychiatric  Speech:  spontaneous, rapid   Mood & Affect:  euthymic  mood-congruent   Thought Process:  goal-directed, logical   Associations:  intact   Thought Content:  normal, no suicidality, no homicidality, delusions, or paranoia   Insight:  has awareness of illness   Judgement: age appropriate   Orientation:  person, place, time/date, situation, day of week   Memory: intact for content of interview   Language: grossly intact   Attention Span & Concentration:  able to focus, completed tasks   Fund " of Knowledge:  intact and appropriate to age and level of education     Assessment and Diagnosis   Status/Progress: Based on the examination today, the patient's problem(s) is/are well controlled, but with subtle signs of increased sensitivity/irritabilty as he tapers off citalopram.  New problems have not been presented today.   Comorbidities complicate management of the primary condition.  There are no active rule-out diagnoses for this patient at this time.    General Impression:   Still struggling with separation-differentiation challenges with enmeshed co-twin sister but less preoccupied with this.    Axis I:   1. Autism spectrum disorder, level 1     2. Excoriation (skin-picking) disorder         Intervention/Counseling/Treatment Plan   · Medication Management: The risks and benefits of medication were discussed with the patient. continue off all medications  · Outside records/collateral information from additional sources: reviewed none today  · Counseling provided with patient as follows: risk factor reduction  · Care Coordination: During the visit, care coordination was conducted with  family.    Return to Clinic: 2 months

## 2018-01-10 ENCOUNTER — OFFICE VISIT (OUTPATIENT)
Dept: PSYCHIATRY | Facility: CLINIC | Age: 28
End: 2018-01-10
Payer: MEDICAID

## 2018-01-10 VITALS
DIASTOLIC BLOOD PRESSURE: 58 MMHG | HEIGHT: 70 IN | HEART RATE: 78 BPM | BODY MASS INDEX: 21.28 KG/M2 | WEIGHT: 148.63 LBS | SYSTOLIC BLOOD PRESSURE: 115 MMHG

## 2018-01-10 DIAGNOSIS — F42.4 EXCORIATION (SKIN-PICKING) DISORDER: ICD-10-CM

## 2018-01-10 DIAGNOSIS — F84.0 AUTISM SPECTRUM DISORDER: Primary | ICD-10-CM

## 2018-01-10 PROCEDURE — 99999 PR PBB SHADOW E&M-EST. PATIENT-LVL III: CPT | Mod: PBBFAC,,, | Performed by: PSYCHIATRY & NEUROLOGY

## 2018-01-10 PROCEDURE — 90833 PSYTX W PT W E/M 30 MIN: CPT | Mod: PBBFAC | Performed by: PSYCHIATRY & NEUROLOGY

## 2018-01-10 PROCEDURE — 90833 PSYTX W PT W E/M 30 MIN: CPT | Mod: AF,HB,S$PBB, | Performed by: PSYCHIATRY & NEUROLOGY

## 2018-01-10 PROCEDURE — 99213 OFFICE O/P EST LOW 20 MIN: CPT | Mod: AF,HB,S$PBB, | Performed by: PSYCHIATRY & NEUROLOGY

## 2018-01-10 PROCEDURE — 99213 OFFICE O/P EST LOW 20 MIN: CPT | Mod: PBBFAC | Performed by: PSYCHIATRY & NEUROLOGY

## 2018-01-10 NOTE — PROGRESS NOTES
Outpatient Psychiatry Follow-Up Visit (MD/NP)  1/10/2018    Clinical Status of Patient:  Outpatient (Ambulatory)  Chief Complaint:  Jeffery Iniguez is a 27 y.o. male who presents today for follow-up of Autism spectrum disorder       Met with patient    Interval History and Content of Current Session:  · States that his nightmares have stopped almost completely since last visit. He is not able to identify any causal hypothesis for the change.  · Much less anger and bitterness at his co-twin sister recently, tolerating her opinions better  ·     Psychotherapy:  · Target symptoms: anxiety , loneliness, anger  · Why chosen therapy is appropriate versus another modality: relevant to diagnosis, patient responds to this modality  · Outcome monitoring methods: self-report, observation, feedback from family  · Therapeutic intervention type: insight oriented psychotherapy  · Topics discussed/themes: relationships difficulties, life stage transitional issues  · The patient's response to the intervention is accepting. The patient's progress toward treatment goals is excellent.   · Duration of intervention: 22 minutes    Review of Systems   History obtained from the patient.  General ROS: negative for - weight loss  Ophthalmic ROS: negative for - decreased vision or dry eyes  ENT ROS: negative for - epistaxis, nasal congestion or oral lesions  Endocrine ROS: negative for - change in hair pattern, skin changes or temperature intolerance  Respiratory ROS: no cough, shortness of breath  Cardiovascular ROS: no chest pain or dyspnea on exertion  Gastrointestinal ROS: no abdominal pain, change in bowel habits  Urinary ROS: no dysuria, trouble voiding or hematuria  Neurological ROS: negative for - headaches, seizures, tremors, tics, or other AIMs  Dermatological ROS: negative for pruritus and rash. Acne is gradually improving. No excoriations!    Past Medical, Family and Social History: The patient's past medical, family and social  "history have been reviewed and updated as appropriate within the electronic medical record - see encounter notes.  Past Medical History:   Diagnosis Date    Autism spectrum disorder, level 1 7/9/2012    Social anxiety disorder 7/9/2012    Girls and dating fears      Current Outpatient Prescriptions on File Prior to Visit   Medication Sig Dispense Refill    AFLURIA [id number], PF, 45 mcg (15 mcg x 3)/0.5 mL Syrg   0    clindamycin phosphate 1% (CLINDAGEL) 1 % gel       doxycycline (VIBRAMYCIN) 100 MG Cap       tretinoin (RETIN-A) 0.05 % cream        No current facility-administered medications on file prior to visit.    Compliance: yes  Side effects: None    Risk Parameters:  Patient reports no suicidal ideation  Patient reports no homicidal ideation  Patient reports no self-injurious behavior  Patient reports no violent behavior  Denies any alcohol or drug use or sexual risk behaviors    Exam (detailed: at least 9 elements; comprehensive: all 15 elements)   Constitutional  Vitals:   height is 5' 10" (1.778 m) and weight is 67.4 kg (148 lb 9.6 oz). His blood pressure is 115/58 (abnormal) and his pulse is 78.      General:  casually dressed, neatly groomed, lean   Musculoskeletal  Muscle Strength/Tone:  no tremor, no tic   Gait & Station:  non-ataxic   Psychiatric  Speech:  spontaneous, rapid   Mood & Affect:  euthymic  mood-congruent   Thought Process:  goal-directed, logical   Associations:  intact   Thought Content:  normal, no suicidality, no homicidality, delusions, or paranoia   Insight:  has awareness of illness   Judgement: age appropriate   Orientation:  person, place, time/date, situation, day of week   Memory: intact for content of interview   Language: grossly intact   Attention Span & Concentration:  able to focus, completed tasks   Fund of Knowledge:  intact and appropriate to age and level of education     Assessment and Diagnosis   Status/Progress: Based on the examination today, the patient's " problem(s) is/are well controlled. New problems have not been presented today. Comorbidities complicate management of the primary condition.  There are no active rule-out diagnoses for this patient at this time.    General Impression:   Still struggling with separation-differentiation challenges with enmeshed co-twin sister but less preoccupied with this.    Axis I:   1. Autism spectrum disorder, level 1     2. Excoriation (skin-picking) disorder       Intervention/Counseling/Treatment Plan   · Medication Management: continue off all medications; he will consider a trial of OTC NAC 3-4 grams daily for excoriation disorder  · Outside records/collateral information from additional sources: reviewed collateral history from mother  · Counseling provided with patient as follows: risk factor reduction, prognosis  · Care Coordination: During the visit, care coordination was conducted with  family.    Return to Clinic: 2 months

## 2018-03-14 ENCOUNTER — OFFICE VISIT (OUTPATIENT)
Dept: PSYCHIATRY | Facility: CLINIC | Age: 28
End: 2018-03-14
Payer: MEDICAID

## 2018-03-14 VITALS
BODY MASS INDEX: 20.75 KG/M2 | SYSTOLIC BLOOD PRESSURE: 110 MMHG | HEART RATE: 81 BPM | WEIGHT: 144.63 LBS | DIASTOLIC BLOOD PRESSURE: 60 MMHG

## 2018-03-14 DIAGNOSIS — F84.0 AUTISM SPECTRUM DISORDER: Primary | ICD-10-CM

## 2018-03-14 DIAGNOSIS — F42.4 EXCORIATION (SKIN-PICKING) DISORDER: ICD-10-CM

## 2018-03-14 PROCEDURE — 99212 OFFICE O/P EST SF 10 MIN: CPT | Mod: PBBFAC | Performed by: PSYCHIATRY & NEUROLOGY

## 2018-03-14 PROCEDURE — 99999 PR PBB SHADOW E&M-EST. PATIENT-LVL II: CPT | Mod: PBBFAC,,, | Performed by: PSYCHIATRY & NEUROLOGY

## 2018-03-14 PROCEDURE — 99213 OFFICE O/P EST LOW 20 MIN: CPT | Mod: AF,HB,S$PBB, | Performed by: PSYCHIATRY & NEUROLOGY

## 2018-03-14 PROCEDURE — 90833 PSYTX W PT W E/M 30 MIN: CPT | Mod: PBBFAC | Performed by: PSYCHIATRY & NEUROLOGY

## 2018-03-14 PROCEDURE — 90833 PSYTX W PT W E/M 30 MIN: CPT | Mod: AF,HB,S$PBB, | Performed by: PSYCHIATRY & NEUROLOGY

## 2018-03-14 NOTE — PROGRESS NOTES
"Outpatient Psychiatry Follow-Up Visit (MD/NP)  3/14/2018    Clinical Status of Patient:  Outpatient (Ambulatory)  Chief Complaint:  Jeffery Iniguez is a 27 y.o. 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 Shocked, angry, "feel like I'm punched in the gut."   · Feels he has been "emotionally compromised" in his work at the [employer], took 2 days off.  · Eating okay.  · Says he ruminates at night re the man's wife and 3 year old son in the [place]; result is initial and middle insomnia. We reflect on his focusing on an issue about which he has no power      Psychotherapy:  · Target symptoms: anxiety , loneliness, anger  · Why chosen therapy is appropriate versus another modality: relevant to diagnosis, patient responds to this modality  · Outcome monitoring methods: self-report, observation, feedback from family  · Therapeutic intervention type: insight oriented psychotherapy  · Topics discussed/themes: relationships difficulties, life stage transitional issues  · The patient's response to the intervention is accepting. The patient's progress toward treatment goals is excellent.   · Duration of intervention: 20 minutes    Review of Systems   History obtained from the patient.  General ROS: negative for - weight loss  Ophthalmic ROS: negative for - decreased vision or dry eyes  ENT ROS: negative for - epistaxis, nasal congestion or oral lesions  Endocrine ROS: negative for - change in hair pattern, skin changes or temperature intolerance  Respiratory ROS: no cough, shortness of breath  Cardiovascular ROS: no chest pain or dyspnea on exertion  Gastrointestinal ROS: no abdominal pain, change in bowel habits  Urinary ROS: no dysuria, trouble voiding or hematuria  Neurological ROS: " negative for - headaches, seizures, tremors, tics, or other AIMs  Dermatological ROS: negative for pruritus and rash. Acne is minor. No excoriations!    Past Medical, Family and Social History: The patient's past medical, family and social history have been reviewed and updated as appropriate within the electronic medical record - see encounter notes.  Past Medical History:   Diagnosis Date    Autism spectrum disorder, level 1 7/9/2012    Social anxiety disorder 7/9/2012    Girls and dating fears      Current Outpatient Prescriptions on File Prior to Visit   Medication Sig Dispense Refill    AFLURIA [id number], PF, 45 mcg (15 mcg x 3)/0.5 mL Syrg   0    clindamycin phosphate 1% (CLINDAGEL) 1 % gel       doxycycline (VIBRAMYCIN) 100 MG Cap       tretinoin (RETIN-A) 0.05 % cream        No current facility-administered medications on file prior to visit.    Compliance: yes  Side effects: None    Risk Parameters:  Patient reports no suicidal ideation  Patient reports no homicidal ideation  Patient reports no self-injurious behavior  Patient reports no violent behavior  Denies any alcohol or drug use or sexual risk behaviors    Exam (detailed: at least 9 elements; comprehensive: all 15 elements)   Constitutional  Vitals:   weight is 65.6 kg (144 lb 10 oz). His blood pressure is 110/60 and his pulse is 81.      General:  casually dressed, neatly groomed, lean   Musculoskeletal  Muscle Strength/Tone:  no tremor, no tic   Gait & Station:  non-ataxic   Psychiatric  Speech:  spontaneous, rapid   Mood & Affect:  euthymic  mood-congruent   Thought Process:  goal-directed, logical   Associations:  intact   Thought Content:  normal, no suicidality, no homicidality, delusions, or paranoia   Insight:  has awareness of illness   Judgement: age appropriate   Orientation:  person, place, time/date, situation, day of week   Memory: intact for content of interview   Language: grossly intact   Attention Span & Concentration:  able  to focus, completed tasks   Fund of Knowledge:  intact and appropriate to age and level of education     Assessment and Diagnosis   Status/Progress: Based on the examination today, the patient's problem(s) is/are well controlled with the exception of acute bereavement. New problems have not been presented today. Comorbidities complicate management of the primary condition.  There are no active rule-out diagnoses for this patient at this time.    General Impression:   Acute bereavement    Axis I:   1. Autism spectrum disorder, level 1     2. Excoriation (skin-picking) disorder       Intervention/Counseling/Treatment Plan   · Medication Management: continue off all medications; he will consider a trial of OTC NAC 3-4 grams daily for excoriation disorder  · Outside records/collateral information from additional sources: reviewed collateral history from mother  · Counseling provided with patient as follows: risk factor reduction, prognosis  · Care Coordination: During the visit, care coordination was conducted with  family.    Return to Clinic: 2 months